# Patient Record
Sex: MALE | Race: WHITE | NOT HISPANIC OR LATINO | Employment: FULL TIME | URBAN - NONMETROPOLITAN AREA
[De-identification: names, ages, dates, MRNs, and addresses within clinical notes are randomized per-mention and may not be internally consistent; named-entity substitution may affect disease eponyms.]

---

## 2021-05-10 ENCOUNTER — TELEPHONE (OUTPATIENT)
Dept: URGENT CARE | Facility: CLINIC | Age: 26
End: 2021-05-10

## 2021-05-10 DIAGNOSIS — M25.561 POSTERIOR KNEE PAIN, RIGHT: Primary | ICD-10-CM

## 2021-05-10 NOTE — TELEPHONE ENCOUNTER
Patient called requesting a referral to a specialist for knee pain.    Phoned patient for update on status of knee.  He states he had some relief for 2 to 3 days following his visit here in the injections.  He states that now the pain has become worse but continues to be off and on.  He is frustrated with the inability to determine what is causing the pain but it is becoming significantly worse and affecting his abilities to perform his job.  He requests for referral.  I will put in the referral and notified him of this.

## 2021-05-20 ENCOUNTER — OFFICE VISIT (OUTPATIENT)
Dept: ORTHOPEDIC SURGERY | Facility: CLINIC | Age: 26
End: 2021-05-20

## 2021-05-20 VITALS — WEIGHT: 195 LBS | BODY MASS INDEX: 29.55 KG/M2 | TEMPERATURE: 96.9 F | HEIGHT: 68 IN

## 2021-05-20 DIAGNOSIS — M25.561 ARTHRALGIA OF RIGHT KNEE: Primary | ICD-10-CM

## 2021-05-20 PROCEDURE — 99203 OFFICE O/P NEW LOW 30 MIN: CPT | Performed by: ORTHOPAEDIC SURGERY

## 2021-05-20 NOTE — PROGRESS NOTES
Subjective   Patient ID: Stephen Sanchez is a 25 y.o. male  Pain of the Right Knee (Referred by [facility] for posterior right knee pain x 1 month, no known injury. Pain is getting worse. He has taken ibuprofen OTC, Toradol inj given on 5/4/21 @ . 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 Denies paresthesias in the lower leg swelling in the ankle or calf. No history of sport related injury in the remote past or any significant instability complaints. Most the pain is midline posterior hurts to stand twisting been occasionally it pops.      Review of Systems   Constitutional: Negative for fever.   HENT: Negative for dental problem and voice change.    Eyes: Negative for visual disturbance.   Respiratory: Negative for shortness of breath.    Cardiovascular: Negative for chest pain.   Gastrointestinal: Negative for abdominal pain.   Genitourinary: Negative for dysuria.   Musculoskeletal: Positive for arthralgias. Negative for gait problem and joint swelling.   Skin: Negative for rash.   Neurological: Negative for speech difficulty.   Hematological: Does not bruise/bleed easily.   Psychiatric/Behavioral: Negative for confusion.       No past medical history on file.     No past surgical history on file.    No family history on file.    Social History     Socioeconomic History   • Marital status: Single     Spouse name: Not on file   • Number of children: Not on file   • Years of education: Not on file   • Highest education level: Not on file   Tobacco Use   • Smoking status: Never Smoker  "  • Smokeless tobacco: Never Used       I have reviewed the medical and surgical history, family history, social history, medications, and/or allergies, and the review of systems of this report.    No Known Allergies    No current outpatient medications on file.    Objective   Temp 96.9 °F (36.1 °C)   Ht 172.7 cm (68\")   Wt 88.5 kg (195 lb)   BMI 29.65 kg/m²    Physical Exam  Constitutional: Patient is oriented to person, place, and time. Patient appears well-developed and well-nourished.   HENT:Head: Normocephalic and atraumatic.   Eyes: EOM are normal. Pupils are equal, round, and reactive to light.   Neck: Normal range of motion. Neck supple.   Cardiovascular: Normal rate.    Pulmonary/Chest: Effort normal and breath sounds normal.   Abdominal: Soft.   Neurological: Patient is alert and oriented to person, place, and time.   Skin: Skin is warm and dry.   Psychiatric: Patient has a normal mood and affect.   Nursing note and vitals reviewed.       [unfilled]   Right knee: Midline posterior tenderness loss of extension of 5 to 8 degrees, positive Benoit's finding slight effusion no ligamentous instability Lockman sign negative no lateral joint line pain normal patellar tracking calf supple neurovascularly intact    Assessment/Plan   Review of Radiographic Studies:    No new imaging done today.      Procedures     Diagnoses and all orders for this visit:    1. Arthralgia of right knee (Primary)  -     MRI Knee Right Without Contrast       Orthopedic activities reviewed and patient expressed appreciation and Using illustrations and models, the nature of the pathology was explained to the patient      Recommendations/Plan:   Work/Activity Status: May perform usual activities as tolerated    Patient agreeable to call or return sooner for any concerns.             Impression:  Loss of motion effusion over 1 month of pain not improved with anti-inflammatory medications probable meniscal tear and/or articular " cartilage lesion with posterior midline knee pain  Plan:  MRI right knee continue icing use of ibuprofen discuss further treatment after MR complete

## 2021-06-18 ENCOUNTER — HOSPITAL ENCOUNTER (OUTPATIENT)
Dept: MRI IMAGING | Facility: HOSPITAL | Age: 26
Discharge: HOME | End: 2021-06-18

## 2021-07-13 ENCOUNTER — HOSPITAL ENCOUNTER (OUTPATIENT)
Dept: MRI IMAGING | Facility: HOSPITAL | Age: 26
Discharge: HOME | End: 2021-07-13
Admitting: ORTHOPAEDIC SURGERY

## 2021-07-13 PROCEDURE — 73721 MRI JNT OF LWR EXTRE W/O DYE: CPT

## 2021-07-20 ENCOUNTER — OFFICE VISIT (OUTPATIENT)
Dept: ORTHOPEDIC SURGERY | Facility: CLINIC | Age: 26
End: 2021-07-20

## 2021-07-20 DIAGNOSIS — M25.561 ARTHRALGIA OF RIGHT KNEE: Primary | ICD-10-CM

## 2021-07-20 DIAGNOSIS — S86.911A KNEE STRAIN, RIGHT, INITIAL ENCOUNTER: ICD-10-CM

## 2021-07-20 NOTE — PROGRESS NOTES
You have chosen to receive care through a telephone visit. Do you consent to use a telephone visit for your medical care today? Yes    Telephone conversation with Stephen regarding his recent MRI which was entirely normal, he says his knee is feeling better he did have one episode where it felt like it locked he had to walk on his toes for 3 to 4 hours and then it improved has not had any recurrence since, he is back to doing his work activities but still feels some weakness in the knee.  Recommendation made for physical therapy reexam with me in a month if is not improved in a month we could consider diagnostic arthroscopy to see if he has occult meniscal tear and/or chondral injury that was not seen on his MRI.  Referral made to [facility] for PT for a month